# Patient Record
Sex: FEMALE | Race: WHITE | NOT HISPANIC OR LATINO | URBAN - NONMETROPOLITAN AREA
[De-identification: names, ages, dates, MRNs, and addresses within clinical notes are randomized per-mention and may not be internally consistent; named-entity substitution may affect disease eponyms.]

---

## 2019-05-15 ENCOUNTER — IMPORTED ENCOUNTER (OUTPATIENT)
Dept: URBAN - NONMETROPOLITAN AREA CLINIC 1 | Facility: CLINIC | Age: 74
End: 2019-05-15

## 2019-05-15 PROCEDURE — 99213 OFFICE O/P EST LOW 20 MIN: CPT

## 2019-05-15 NOTE — PATIENT DISCUSSION
Cataracts OU-  Discussed findings w/ pt today-  Recommend UV protection-  Monitor yearly or PRN progressionPVD OU-  Discussed findings of exam in detail with the patient. -  The risk of retinal detachment in patients with PVDs was discussed with the patient and the warning signs of retinal detachment were carefully reviewed with the patient. -  The patient was warned to return to the office or contact the ophthalmologist on call immediately if they experience signs of retinal detachment or changes in vision noted from today. -  Continue to monitor PRN NOTE:  Patient deferred MR today no GLRx given. Okay to use OTC readers PRN.

## 2019-11-07 PROBLEM — H25.813: Noted: 2018-01-15

## 2019-11-07 PROBLEM — H43.813: Noted: 2019-11-07

## 2019-11-07 PROBLEM — H52.4: Noted: 2018-01-15

## 2022-04-15 ASSESSMENT — VISUAL ACUITY
OS_GLARE: 20/30
OD_CC: 20/25+
OS_CC: 20/25-
OU_CC: 20/20-
OD_GLARE: 20/30

## 2022-04-15 ASSESSMENT — TONOMETRY
OS_IOP_MMHG: 14
OD_IOP_MMHG: 14

## 2023-02-01 RX ORDER — HYDROCHLOROTHIAZIDE 12.5 MG/1
12.5 TABLET ORAL EVERY OTHER DAY
COMMUNITY

## 2023-02-01 RX ORDER — OMEPRAZOLE 20 MG/1
20 TABLET, DELAYED RELEASE ORAL
COMMUNITY

## 2023-02-01 RX ORDER — HYOSCYAMINE SULFATE 0.125 MG
0.12 TABLET,DISINTEGRATING ORAL DAILY
COMMUNITY

## 2023-02-01 RX ORDER — ASPIRIN 325 MG
325 TABLET ORAL PRN
COMMUNITY

## 2023-02-01 RX ORDER — CHOLECALCIFEROL (VITAMIN D3) 125 MCG
CAPSULE ORAL PRN
COMMUNITY